# Patient Record
(demographics unavailable — no encounter records)

---

## 2024-12-14 NOTE — HISTORY OF PRESENT ILLNESS
[FreeTextEntry8] : 88 year old female with PMH of HTN, HLD, depression, anxiety, right breast cancer s/p lumpectomy and mastectomy and radiation 56 years ago, anemia, celiac disease, osteoarthritis, coronary artery disease, s/p TAVR, s/p PPM is coming in for cough, wheezing, PARADA.  3 months ago, saw PCP  For the past month, has had cough, wheezing, PARADA. She had stopped taking her Levocetirizine. However, she restarted it 2 days and felt better.

## 2024-12-14 NOTE — ASSESSMENT
[FreeTextEntry1] : 82M former smoker, w/ hx of Hx esophageal AdenoCA of GE junction s/p transhiatal esophagectomy, J-tube placement on 2004, s/p multiple EGD dilation, BPH, T2DM is coming in for follow up of chronic conditions.   #ASthma -C/w Levocetirizne 5mg qd for now -Levalbuterol q6h prn -Start Symbicort 80-4.5mcg 2 puffs twice daily -Chest Xray ordered   RTC if no improvement

## 2024-12-14 NOTE — PHYSICAL EXAM
[Normal] : no acute distress, well nourished, well developed and well-appearing [No Respiratory Distress] : no respiratory distress  [No Accessory Muscle Use] : no accessory muscle use [Clear to Auscultation] : lungs were clear to auscultation bilaterally [Speech Grossly Normal] : speech grossly normal [Normal Mood] : the mood was normal

## 2024-12-26 NOTE — PHYSICAL EXAM
[No Respiratory Distress] : no respiratory distress  [No Accessory Muscle Use] : no accessory muscle use [Clear to Auscultation] : lungs were clear to auscultation bilaterally [No Focal Deficits] : no focal deficits [Normal] : affect was normal and insight and judgment were intact

## 2024-12-26 NOTE — HISTORY OF PRESENT ILLNESS
[Family Member] : family member [FreeTextEntry1] : 88 year old female with PMH of HTN, HLD, depression, anxiety, right breast cancer s/p lumpectomy and mastectomy and radiation 56 years ago, anemia, celiac disease, osteoarthritis, coronary artery disease s/p CABG, s/p TAVR, s/p PPM is coming in for follow up of cough, wheezing, PARADA. [de-identified] : 88 year old female with PMH of HTN, HLD, depression, anxiety, right breast cancer s/p lumpectomy and mastectomy and radiation 56 years ago, anemia, celiac disease, osteoarthritis, coronary artery disease s/p CABG, s/p TAVR, s/p PPM is coming in for follow up of cough, wheezing, PARADA.  Patient is improving now, feels as if SOB has been improving over the last few days. Has been using Symbicort consistently. No new complaints.   Last saw Cards in summer.   Has been unable to find an early Pulm appt.

## 2024-12-26 NOTE — ASSESSMENT
---------------------  From: Arben FITZGERALD [place]   To: IRENE BURGESS    Sent: 11/1/2019 8:18:48 AM CDT  Subject: Patient Message - Results Notification        These are acceptable, please keep scheduled follow up appointments.    Sahil Theodore MD    Results:  Date Result Name Ind Value Ref Range   10/31/2019 8:33 AM Sodium Level  141 mmol/L (135 - 146)   10/31/2019 8:33 AM Potassium Level  4.7 mmol/L (3.5 - 5.3)   10/31/2019 8:33 AM Chloride Level  106 mmol/L (98 - 110)   10/31/2019 8:33 AM CO2 Level  31 mmol/L (20 - 32)   10/31/2019 8:33 AM Glucose Level  93 mg/dL (65 - 99)   10/31/2019 8:33 AM BUN ((L)) 5 mg/dL (7 - 25)   10/31/2019 8:33 AM Creatinine Level  0.72 mg/dL (0.60 - 0.93)   10/31/2019 8:33 AM BUN/Creat Ratio  7 (6 - 22)   10/31/2019 8:33 AM eGFR  84 mL/min/1.73m2 (> OR = 60 - )   10/31/2019 8:33 AM eGFR African American  98 mL/min/1.73m2 (> OR = 60 - )   10/31/2019 8:33 AM Calcium Level  9.9 mg/dL (8.6 - 10.4)   10/31/2019 8:33 AM Bilirubin Total  0.4 mg/dL (0.2 - 1.2)   10/31/2019 8:33 AM Alkaline Phosphatase  68 unit/L (33 - 130)   10/31/2019 8:33 AM AST/SGOT  20 unit/L (10 - 35)   10/31/2019 8:33 AM ALT/SGPT  16 unit/L (6 - 29)   10/31/2019 8:33 AM Protein Total  6.6 gm/dL (6.1 - 8.1)   10/31/2019 8:33 AM Albumin Level  4.3 gm/dL (3.6 - 5.1)   10/31/2019 8:33 AM Globulin  2.3 (1.9 - 3.7)   10/31/2019 8:33 AM A/G Ratio  1.9 (1.0 - 2.5)   10/31/2019 8:33 AM Cholesterol  180 mg/dL ( - <200)   10/31/2019 8:33 AM Non-HDL Cholesterol  100 ( - <130)   10/31/2019 8:33 AM HDL  80 mg/dL (>50 - )   10/31/2019 8:33 AM Cholesterol/HDL Ratio  2.3 ( - <5.0)   10/31/2019 8:33 AM LDL  84    10/31/2019 8:33 AM Triglyceride  70 mg/dL ( - <150)   10/31/2019 8:33 AM WBC  5.4 (3.8 - 10.8)   10/31/2019 8:33 AM RBC  4.38 (3.80 - 5.10)   10/31/2019 8:33 AM Hgb  13.5 gm/dL (11.7 - 15.5)   10/31/2019 8:33 AM Hct  40.0 % (35.0 - 45.0)   10/31/2019 8:33 AM MCV  91.3 fL (80.0 - 100.0)   10/31/2019 8:33 AM MCH  30.8 pg  [FreeTextEntry1] : 88 year old female with PMH of HTN, HLD, depression, anxiety, right breast cancer s/p lumpectomy and mastectomy and radiation 56 years ago, anemia, celiac disease, osteoarthritis, coronary artery disease s/p CABG, s/p TAVR, s/p PPM is coming in for follow up of cough, wheezing, PARADA.  #SOB -Improved now that patient has been taking Symbicort - C/w Budesonide-Formoterol Fumarate 80-4.5 MCG/ACT Inhalation Aerosol; INHALE 2 PUFFS BY MOUTH TWICE DAILY -Pulm Consult, will try to expedite   #CArdiomegaly  -Reviewed Chest Xray findings with patient and daughter. Cardiomegaly and vascular congestion - recommended that patient follow up w/ CArdiologist. Unclear if patient had recent TTE  #HTN -BP acceptable today -C/w Lopressor 100mg BID, Valsartan-hydroCHLOROthiazide 320-25 MG qd and  Verapamil HCl  MG Oral Tablet Extended Release qd  RTC in 3 months  (27.0 - 33.0)   10/31/2019 8:33 AM MCHC  33.8 gm/dL (32.0 - 36.0)   10/31/2019 8:33 AM RDW  12.2 % (11.0 - 15.0)   10/31/2019 8:33 AM Platelet  323 (140 - 400)   10/31/2019 8:33 AM MPV  10.4 fL (7.5 - 12.5)

## 2025-01-27 NOTE — ASSESSMENT
[FreeTextEntry1] : 89 year old female with PMH of HTN, HLD, depression, anxiety, right breast cancer s/p lumpectomy and mastectomy and radiation 56 years ago, anemia, celiac disease, osteoarthritis, coronary artery disease s/p CABG, s/p TAVR, s/p PPM is coming in for cough and wheezing.  #Cough #Wheezing  -Given Chest Xray findings, recommended that patient complete course of Zpack and Augmentin 875-125mg BID for 7 days -recommended patient to start prednisone 40mg qd for 5 days -C/w Symbicort, as patient states it has been helping -Patient to follow up w/ Pulm in the next few weeks   RTC in 3 months for AWV

## 2025-01-27 NOTE — PHYSICAL EXAM
[No Respiratory Distress] : no respiratory distress  [No Accessory Muscle Use] : no accessory muscle use [Clear to Auscultation] : lungs were clear to auscultation bilaterally [Normal Rate] : normal rate  [Regular Rhythm] : with a regular rhythm [No Focal Deficits] : no focal deficits [Normal] : affect was normal and insight and judgment were intact [de-identified] : trace edema on lower extremities b/l

## 2025-01-27 NOTE — HISTORY OF PRESENT ILLNESS
[Family Member] : family member [FreeTextEntry8] : 89 year old female with PMH of HTN, HLD, depression, anxiety, right breast cancer s/p lumpectomy and mastectomy and radiation 56 years ago, anemia, celiac disease, osteoarthritis, coronary artery disease s/p CABG, s/p TAVR, s/p PPM is coming in for cough and wheezing.  For the past week, Has had cough, wheezing, PARADA. Went to , get Chest Xray, got Augmentin, Zpack, Prednisone course. Has taken about 3 days of abx so far. Has not taken prednisone course yet, was told by her Cardiologist today that it was fine to take.  Chest Xray showed lung markings, no consolidation.   Has Pulm appt in 1.5 weeks.

## 2025-05-21 NOTE — HISTORY OF PRESENT ILLNESS
[Other:____] : [unfilled] [Home] : at home, [unfilled] , at the time of the visit. [Telehealth (audio & video)] : This visit was provided via telehealth using real-time 2-way audio visual technology. [Post-hospitalization from ___ Hospital] : Post-hospitalization from [unfilled] Hospital [Admitted on: ___] : The patient was admitted on [unfilled] [Discharged on ___] : discharged on [unfilled] [Discharge Summary] : discharge summary [Pertinent Labs] : pertinent labs [Discharge Med List] : discharge medication list [Med Reconciliation] : medication reconciliation has been completed [FreeTextEntry2] : 88 y/o female with h/o HTN, hyperlipidemia, pulmonary hypertension, CAD s/p PCI, TAVR and asthma who was admitted with acute hypoxic respiratory failure due pneumonia in the setting of sever pulmonary hypertension. SHe completed a 7 day course of ceftriaxone and doxycycline and steroids. She was treated with IV laxis for acute diastolic heart failure. She was hyponatremia which resolved. SHe was discharged on a prednisone taper. and oxygen. Her O2 sat is 94% off oxygen

## 2025-06-24 NOTE — PLAN
[FreeTextEntry1] : 90 y/o female with h/o HTN, hyperlipidemia, pulmonary hypertension, CAD , s/p CABG, s/p TAVR, PPM , asthma  came for a f/u F/u exam is performed. Recommend  to do a blood test today, further management will depend on the blood test results.  Result of the blood test from 05/22/25 discussed with the patient in detail.  Patient has elevated LFT - will repeat blood test Has an elev WBC, mild anemia - will repeat test Hyperlipidemia - continue lipitor 80mg  CAD, s/p CABG continue lipitor, plavix, verapamil HTN - continue  metoprolol, verapamil, lasix Asthma continue oxygen supplement - f/u with a pulmonologist.  RTC to f/u in 2 wks. Patient is verbalized understanding

## 2025-06-24 NOTE — REVIEW OF SYSTEMS
[Fatigue] : fatigue [Negative] : Heme/Lymph [Fever] : no fever [Chills] : no chills [Headache] : no headache [Dizziness] : no dizziness [FreeTextEntry6] : c/o SOB on exertion

## 2025-06-24 NOTE — PHYSICAL EXAM
[TextEntry] : Constitutional: Well  appearing, not in acute distress, sitting in a wheelchair, using oxygen via a NC Head: Normocephalic, no lesions Eyes: PERRLA, conjunctiva is NL Ear: Ear canal is normal, tympanic membrane is intact Nose: Nasal turbinates are NL Throat: Clear, no exudates, no lesions Neck: Supple, no masses Chest: Lungs are clear, no rales, no rhonchi, no wheezing Heart: Regular rate, no murmurs Abdomen: Soft, no tenderness : No CVAT Neuro: AAO x 3, no focal neurological deficit.

## 2025-06-24 NOTE — REVIEW OF SYSTEMS
Site (pad location): lateral thigh. Laterality: right. Grounding pad site assessment: skin integrity intact. [Fatigue] : fatigue [Negative] : Heme/Lymph [Fever] : no fever [Chills] : no chills [Headache] : no headache [Dizziness] : no dizziness [FreeTextEntry6] : c/o SOB on exertion

## 2025-06-24 NOTE — PLAN
[FreeTextEntry1] : 88 y/o female with h/o HTN, hyperlipidemia, pulmonary hypertension, CAD , s/p CABG, s/p TAVR, PPM , asthma  came for a f/u F/u exam is performed. Recommend  to do a blood test today, further management will depend on the blood test results.  Result of the blood test from 05/22/25 discussed with the patient in detail.  Patient has elevated LFT - will repeat blood test Has an elev WBC, mild anemia - will repeat test Hyperlipidemia - continue lipitor 80mg  CAD, s/p CABG continue lipitor, plavix, verapamil HTN - continue  metoprolol, verapamil, lasix Asthma continue oxygen supplement - f/u with a pulmonologist.  RTC to f/u in 2 wks. Patient is verbalized understanding

## 2025-06-24 NOTE — HISTORY OF PRESENT ILLNESS
[FreeTextEntry1] : F/u exam [de-identified] : 90 y/o female with h/o HTN, hyperlipidemia, pulmonary hypertension, CAD , s/p CABG, s/p TAVR, PPM , asthma  came for a f/u. Patient is accompany by her daughter. As per  patient  she was admitted at [facility] on 05/11-05/19/ with acute hypoxic respiratory failure due pneumonia in the setting of severe pulmonary hypertension currently on oxygen via a NC 2L  As per patient  after d/c from the hospital feels weak, no energy.  Did blood test at home on 05/22/25. Last week has low grade fever, lower back pain. Denies dysuria, cough, throat pain. Today came for a check up

## 2025-06-24 NOTE — HISTORY OF PRESENT ILLNESS
[FreeTextEntry1] : F/u exam [de-identified] : 88 y/o female with h/o HTN, hyperlipidemia, pulmonary hypertension, CAD , s/p CABG, s/p TAVR, PPM , asthma  came for a f/u. Patient is accompany by her daughter. As per  patient  she was admitted at [facility] on 05/11-05/19/ with acute hypoxic respiratory failure due pneumonia in the setting of severe pulmonary hypertension currently on oxygen via a NC 2L  As per patient  after d/c from the hospital feels weak, no energy.  Did blood test at home on 05/22/25. Last week has low grade fever, lower back pain. Denies dysuria, cough, throat pain. Today came for a check up

## 2025-07-26 NOTE — ASSESSMENT
[FreeTextEntry1] : 89F w/ CAD s/p CABG (1998), AS s/p TAVR (2015), HFpEF, CHB s/p PPM, severe pHTN, CVA, Breast Cancer s/p mastectomy and radiation, HTN, s/p femoral artery pseudoaneurysm repair, recurrent PNA on home O2 is coming in for HFU after PNA.  #PNA  #Pleural Effusions #Pulm HTN -Completed abx treatment in hospital, asymptomatic now -Recommended patient follow up w/ Pulm for pleural effusions -Renewed inhalers  -Check CBC  #Weight loss -Check TSH -F/u in clinic in 2-3 weeks for reassessment   #Hypokalemia -C/w KCl 10meq daily -Check CMP  #CAD #HFpEF -C/w Cards follow up  RTC for in-person eval in 2-3 weeks

## 2025-07-26 NOTE — HISTORY OF PRESENT ILLNESS
[Post-hospitalization from ___ Hospital] : Post-hospitalization from [unfilled] Hospital [Admitted on: ___] : The patient was admitted on [unfilled] [Discharged on ___] : discharged on [unfilled] [Family Member] : family member [Home] : at home, [unfilled] , at the time of the visit. [Medical Office: ([facility])___] : at the medical office located in  [Telehealth (audio & video)] : This visit was provided via telehealth using real-time 2-way audio visual technology. [Verbal consent obtained from patient] : the patient, [unfilled] [FreeTextEntry2] : 89F w/ CAD s/p CABG (1998), AS s/p TAVR (2015), HFpEF, CHB s/p PPM, severe pHTN, CVA, Breast Cancer s/p mastectomy and radiation, HTN, s/p femoral artery pseudoaneurysm repair, recurrent PNA on home O2 is coming in for HFU after PNA.  Came in for SOB, hypoxia, required HFNC. Found to have PNA, pleural effusions, 0.6cm lung nodule Put on IV abx for HCAP, given IV steroids, IV diuretics. Patient deferred on thoracentesis.  O2 improved, repeat chest xray showed smaller pleural effusions Transitioned to PO steroids and home PO diuretics Course C/b dysphagia. Saw GI and SLP. Esophagram showed achalasia. Underwent EGD w/ botox injection on 7/11. Recommended for outpatient manometry w/ GI. Discharged with PCP, Cards, Pulm, GI follow ups.  Today, feeling better. SOB better. Losing weight since hospital. Nurse coming twice a week   KCL 10mEQ qd.   Alprazolam 0.5mg 2 tabs qhs prn Levalbuterol tartrate 45 Symbicort 80-4.5 Budesonide 0.5/2ml Arformoterol  atorvastatin   citalopram  clopidogrel  pantoprazole 40 furosemide 60mg qd  Lopressor 25mg qd  Verapamil 120mg Finished prednisone   Appt on 7/31 Cardiologist Dr. Shin Has not made appt w/ Dr. Rohan Castellano  yet.

## 2025-07-26 NOTE — HISTORY OF PRESENT ILLNESS
[Post-hospitalization from ___ Hospital] : Post-hospitalization from [unfilled] Hospital [Admitted on: ___] : The patient was admitted on [unfilled] [Discharged on ___] : discharged on [unfilled] [Family Member] : family member [Home] : at home, [unfilled] , at the time of the visit. [Medical Office: ([facility])___] : at the medical office located in  [Telehealth (audio & video)] : This visit was provided via telehealth using real-time 2-way audio visual technology. [Verbal consent obtained from patient] : the patient, [unfilled] [FreeTextEntry2] : 89F w/ CAD s/p CABG (1998), AS s/p TAVR (2015), HFpEF, CHB s/p PPM, severe pHTN, CVA, Breast Cancer s/p mastectomy and radiation, HTN, s/p femoral artery pseudoaneurysm repair, recurrent PNA on home O2 is coming in for HFU after PNA.  Came in for SOB, hypoxia, required HFNC. Found to have PNA, pleural effusions, 0.6cm lung nodule Put on IV abx for HCAP, given IV steroids, IV diuretics. Patient deferred on thoracentesis.  O2 improved, repeat chest xray showed smaller pleural effusions Transitioned to PO steroids and home PO diuretics Course C/b dysphagia. Saw GI and SLP. Esophagram showed achalasia. Underwent EGD w/ botox injection on 7/11. Recommended for outpatient manometry w/ GI. Discharged with PCP, Cards, Pulm, GI follow ups.  Today, feeling better. SOB better. Losing weight since hospital. Nurse coming twice a week   KCL 10mEQ qd.   Alprazolam 0.5mg 2 tabs qhs prn Levalbuterol tartrate 45 Symbicort 80-4.5 Budesonide 0.5/2ml Arformoterol  atorvastatin   citalopram  clopidogrel  pantoprazole 40 furosemide 60mg qd  Lopressor 25mg qd  Verapamil 120mg Finished prednisone   Appt on 7/31 Cardiologist Dr. Shin Has not made appt w/ Dr. Rohan Castellano  yet.